# Patient Record
Sex: FEMALE | Race: WHITE | NOT HISPANIC OR LATINO | Employment: STUDENT | URBAN - METROPOLITAN AREA
[De-identification: names, ages, dates, MRNs, and addresses within clinical notes are randomized per-mention and may not be internally consistent; named-entity substitution may affect disease eponyms.]

---

## 2020-06-24 ENCOUNTER — OFFICE VISIT (OUTPATIENT)
Dept: GASTROENTEROLOGY | Facility: CLINIC | Age: 25
End: 2020-06-24

## 2020-06-24 VITALS
HEART RATE: 75 BPM | BODY MASS INDEX: 19.91 KG/M2 | TEMPERATURE: 97.5 F | WEIGHT: 119.5 LBS | SYSTOLIC BLOOD PRESSURE: 114 MMHG | OXYGEN SATURATION: 98 % | HEIGHT: 65 IN | DIASTOLIC BLOOD PRESSURE: 80 MMHG

## 2020-06-24 DIAGNOSIS — K63.5 POLYP OF COLON, UNSPECIFIED PART OF COLON, UNSPECIFIED TYPE: ICD-10-CM

## 2020-06-24 DIAGNOSIS — Q85.89 PEUTZ-JEGHERS SYNDROME (HCC): Primary | ICD-10-CM

## 2020-06-24 DIAGNOSIS — K31.7 GASTRIC POLYP: ICD-10-CM

## 2020-06-24 PROCEDURE — 99213 OFFICE O/P EST LOW 20 MIN: CPT | Performed by: INTERNAL MEDICINE

## 2020-06-24 RX ORDER — MONTELUKAST SODIUM 10 MG/1
TABLET ORAL
COMMUNITY
Start: 2020-05-12

## 2020-06-24 NOTE — PATIENT INSTRUCTIONS
Check CBC and iron studies today.    Follow up with gynecologist as scheduled for yearly exam.    Schedule capsule endoscopy for evaluation of the small bowel.    We will plan on EGD and colonoscopy to be done December 2020 for surveillance.

## 2020-06-24 NOTE — PROGRESS NOTES
"Follow-up (EGD/CLS)      HPI  Patient is a 24 year old female who presents today for follow up. She has a history of Peutz-Jeghers syndrome.    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    She denies any GI complaints.    CLS 12-19-19 millimeters polyp in the rectum  EGD 12/19/2019 gastric polyps    Biopsies were consistent with hyperplastic polyps of the stomach and \"mucosal prolapse\" from the rectal polyp but being treated as an adenoma    She has an appointment with her gynecologist in 2 weeks for her yearly exam.    Review of Systems   Constitutional: Negative for appetite change, chills, diaphoresis, fatigue, fever and unexpected weight change.   HENT: Negative for dental problem, ear pain, mouth sores, rhinorrhea, sore throat and voice change.    Eyes: Negative for pain, redness and visual disturbance.   Respiratory: Negative for cough, chest tightness and wheezing.    Cardiovascular: Negative for chest pain, palpitations and leg swelling.   Endocrine: Negative for cold intolerance, heat intolerance, polydipsia, polyphagia and polyuria.   Genitourinary: Negative for dysuria, frequency, hematuria and urgency.   Musculoskeletal: Negative for arthralgias, back pain, joint swelling, myalgias and neck pain.   Skin: Negative for rash.   Allergic/Immunologic: Positive for environmental allergies. Negative for food allergies and immunocompromised state.   Neurological: Negative for dizziness, seizures, weakness, numbness and headaches.   Hematological: Does not bruise/bleed easily.   Psychiatric/Behavioral: Negative for sleep disturbance. The patient is not nervous/anxious.         I have reviewed and confirmed the accuracy of the HPI and ROS as documented by the APRN ANAY Mendez     Problem List:  There is no problem list on file for this patient.      Medical History:    Past Medical History:   Diagnosis Date   • Peutz-Jeghers syndrome (CMS/HCC)         Social History:    Social History "     Socioeconomic History   • Marital status: Single     Spouse name: Not on file   • Number of children: Not on file   • Years of education: Not on file   • Highest education level: Not on file   Tobacco Use   • Smoking status: Never Smoker   • Smokeless tobacco: Never Used   Substance and Sexual Activity   • Alcohol use: Yes     Comment: social    • Drug use: Never       Family History:   Family History   Problem Relation Age of Onset   • Colon cancer Neg Hx    • Colon polyps Neg Hx        Surgical History:   Past Surgical History:   Procedure Laterality Date   • COLONOSCOPY     • SINUS SURGERY     • UPPER GASTROINTESTINAL ENDOSCOPY     • WISDOM TOOTH EXTRACTION           Current Outpatient Medications:   •  levonorgestrel (MIRENA) 20 MCG/24HR IUD, 1 each by Intrauterine route., Disp: , Rfl:   •  montelukast (SINGULAIR) 10 MG tablet, TAKE 1 TAB BY MOUTH EVERY MORNING FOR 90 DAYS, Disp: , Rfl:   •  Norethin-Eth Estrad-Fe Biphas (Lo Loestrin Fe) 1 MG-10 MCG / 10 MCG tablet, TAKE 1 TABLET BY MOUTH EVERY DAY, Disp: , Rfl:     Allergies:   Allergies   Allergen Reactions   • Other Other (See Comments)     Pollen,trees,dust,mold,american cockroaches,rat poop,ragweed( and other grasses),mildew,evergreen and cats  Cats  Dogs  Trees  Pollen  Dust  Mold  Ragweed  Grass  Rats  Cockroaches  Pollen,trees,dust,mold,american cockroaches,rat poop,ragweed( and other grasses),mildew,evergreen and cats          The following portions of the patient's history were reviewed and updated as appropriate: allergies, current medications, past family history, past medical history, past social history, past surgical history and problem list.    Vitals:    06/24/20 1249   BP: 114/80   Pulse: 75   Temp: 97.5 °F (36.4 °C)   SpO2: 98%         06/24/20  1249   Weight: 54.2 kg (119 lb 8 oz)     Body mass index is 19.89 kg/m².      Physical Exam   HENT:   Macular spots on oral mucosa (under john)   Abdominal: Soft. Normal appearance and bowel sounds  are normal. She exhibits no distension, no pulsatile midline mass and no mass. There is no tenderness. There is no rigidity, no rebound and no guarding.   Vitals reviewed.       Assessment/ Plan  [place] was seen today for follow-up.    Diagnoses and all orders for this visit:    Peutz-Jeghers syndrome (CMS/HCC)    Polyp of colon, unspecified part of colon, unspecified type    Gastric polyp            No follow-ups on file.    Patient Instructions   Check CBC and iron studies today.    Follow up with gynecologist as scheduled for yearly exam.    Schedule capsule endoscopy for evaluation of the small bowel.    We will plan on EGD and colonoscopy to be done December 2020 for surveillance.          Discussion:  With this condition there is a high risk for: Pancreatic and breast cancer.  Yearly CBCs  Endoscopy and colonoscopy yearly   Either capsule endoscopy or MR enterography about every 3 years  Yearly breast exams  Yearly cervical exams and pelvic ultrasound  Pancreatic imaging with MRCP or endoscopic ultrasound which should be done about every 1 to 2 years as there is actually a family history of pancreatic cancer  Still unclear whether she has a sporadic mutation or hereditary and we have discussed genetic evaluations with her    Documentation by Shruthi CUEVA acting as a scribe in the following sections on behalf of the billable provider: HPI, ROS, assessment, & plan.

## 2020-06-25 LAB
BASOPHILS # BLD AUTO: 0.02 10*3/MM3 (ref 0–0.2)
BASOPHILS NFR BLD AUTO: 0.3 % (ref 0–1.5)
EOSINOPHIL # BLD AUTO: 0.06 10*3/MM3 (ref 0–0.4)
EOSINOPHIL NFR BLD AUTO: 0.9 % (ref 0.3–6.2)
ERYTHROCYTE [DISTWIDTH] IN BLOOD BY AUTOMATED COUNT: 12.5 % (ref 12.3–15.4)
FERRITIN SERPL-MCNC: 12.7 NG/ML (ref 13–150)
HCT VFR BLD AUTO: 39.7 % (ref 34–46.6)
HGB BLD-MCNC: 12.8 G/DL (ref 12–15.9)
IMM GRANULOCYTES # BLD AUTO: 0.02 10*3/MM3 (ref 0–0.05)
IMM GRANULOCYTES NFR BLD AUTO: 0.3 % (ref 0–0.5)
IRON SATN MFR SERPL: 18 % (ref 20–50)
IRON SERPL-MCNC: 107 MCG/DL (ref 37–145)
LYMPHOCYTES # BLD AUTO: 1.83 10*3/MM3 (ref 0.7–3.1)
LYMPHOCYTES NFR BLD AUTO: 27.3 % (ref 19.6–45.3)
MCH RBC QN AUTO: 29.4 PG (ref 26.6–33)
MCHC RBC AUTO-ENTMCNC: 32.2 G/DL (ref 31.5–35.7)
MCV RBC AUTO: 91.3 FL (ref 79–97)
MONOCYTES # BLD AUTO: 0.46 10*3/MM3 (ref 0.1–0.9)
MONOCYTES NFR BLD AUTO: 6.9 % (ref 5–12)
NEUTROPHILS # BLD AUTO: 4.32 10*3/MM3 (ref 1.7–7)
NEUTROPHILS NFR BLD AUTO: 64.3 % (ref 42.7–76)
NRBC BLD AUTO-RTO: 0 /100 WBC (ref 0–0.2)
PLATELET # BLD AUTO: 202 10*3/MM3 (ref 140–450)
RBC # BLD AUTO: 4.35 10*6/MM3 (ref 3.77–5.28)
TIBC SERPL-MCNC: 583 MCG/DL
UIBC SERPL-MCNC: 476 MCG/DL (ref 112–346)
WBC # BLD AUTO: 6.71 10*3/MM3 (ref 3.4–10.8)

## 2020-07-08 ENCOUNTER — TELEPHONE (OUTPATIENT)
Dept: GASTROENTEROLOGY | Facility: CLINIC | Age: 25
End: 2020-07-08

## 2020-07-08 NOTE — TELEPHONE ENCOUNTER
Patient returned call.  Provided appt details and advised patient to contact insurance to verify network status as auth states OON but we are showing that we are in network for [facility].

## 2021-02-01 DIAGNOSIS — Q85.89 PEUTZ-JEGHERS SYNDROME (HCC): Primary | ICD-10-CM

## 2021-02-01 DIAGNOSIS — K31.7 GASTRIC POLYP: ICD-10-CM

## 2021-02-01 DIAGNOSIS — K63.5 POLYP OF COLON, UNSPECIFIED PART OF COLON, UNSPECIFIED TYPE: ICD-10-CM

## 2021-02-03 ENCOUNTER — PREP FOR SURGERY (OUTPATIENT)
Dept: SURGERY | Facility: SURGERY CENTER | Age: 26
End: 2021-02-03

## 2021-02-03 DIAGNOSIS — K31.7 GASTRIC POLYP: ICD-10-CM

## 2021-02-03 DIAGNOSIS — Q85.89 PEUTZ-JEGHERS SYNDROME (HCC): Primary | ICD-10-CM

## 2021-02-03 DIAGNOSIS — K63.5 POLYP OF COLON: ICD-10-CM

## 2021-02-03 RX ORDER — SODIUM CHLORIDE 0.9 % (FLUSH) 0.9 %
3 SYRINGE (ML) INJECTION EVERY 12 HOURS SCHEDULED
Status: CANCELLED | OUTPATIENT
Start: 2021-02-03

## 2021-02-03 RX ORDER — SODIUM CHLORIDE, SODIUM LACTATE, POTASSIUM CHLORIDE, CALCIUM CHLORIDE 600; 310; 30; 20 MG/100ML; MG/100ML; MG/100ML; MG/100ML
30 INJECTION, SOLUTION INTRAVENOUS CONTINUOUS PRN
Status: CANCELLED | OUTPATIENT
Start: 2021-02-03

## 2021-02-03 RX ORDER — SODIUM CHLORIDE 0.9 % (FLUSH) 0.9 %
10 SYRINGE (ML) INJECTION AS NEEDED
Status: CANCELLED | OUTPATIENT
Start: 2021-02-03

## 2021-02-04 ENCOUNTER — PREP FOR SURGERY (OUTPATIENT)
Dept: SURGERY | Facility: SURGERY CENTER | Age: 26
End: 2021-02-04

## 2021-02-16 ENCOUNTER — TRANSCRIBE ORDERS (OUTPATIENT)
Dept: LAB | Facility: SURGERY CENTER | Age: 26
End: 2021-02-16

## 2021-02-16 DIAGNOSIS — Z01.818 OTHER SPECIFIED PRE-OPERATIVE EXAMINATION: Primary | ICD-10-CM

## 2021-03-15 RX ORDER — CETIRIZINE HYDROCHLORIDE 10 MG/1
10 TABLET ORAL DAILY
COMMUNITY

## 2021-03-15 NOTE — SIGNIFICANT NOTE
Education provided the Patient on the following:    - Nothing to Eat or Drink after MN the night before the procedure  -Your required COVID Test is Scheduled on    3/16      Between the Hours of 0805-2263  -You will need to have someone drive you home after your procedure and remain with you for 24 hours after the procedure  - The date of your procedure, your ride is welcome to either remain in our parking lot or within 10-15 minutes of [facility]  -Please wear warm socks and comfortable clothes when you arrive for your procedure  -Remove all jewelry and leave any valuables before arriving on the date of your procedure (all will have to be removed before leaving preop)  -You will need to arrive at   0900        on   3/18        for your procedure  -Feel free to contact us at: [phone] with any additional questions/concerns        Have you ever tested positive for COVID? no

## 2021-03-16 ENCOUNTER — LAB (OUTPATIENT)
Dept: LAB | Facility: SURGERY CENTER | Age: 26
End: 2021-03-16

## 2021-03-16 DIAGNOSIS — Z01.818 OTHER SPECIFIED PRE-OPERATIVE EXAMINATION: ICD-10-CM

## 2021-03-16 LAB — SARS-COV-2 ORF1AB RESP QL NAA+PROBE: NOT DETECTED

## 2021-03-16 PROCEDURE — U0004 COV-19 TEST NON-CDC HGH THRU: HCPCS | Performed by: SURGERY

## 2021-03-16 PROCEDURE — C9803 HOPD COVID-19 SPEC COLLECT: HCPCS

## 2021-03-18 ENCOUNTER — HOSPITAL ENCOUNTER (OUTPATIENT)
Facility: SURGERY CENTER | Age: 26
Setting detail: HOSPITAL OUTPATIENT SURGERY
Discharge: HOME | End: 2021-03-18
Attending: INTERNAL MEDICINE | Admitting: INTERNAL MEDICINE

## 2021-03-18 ENCOUNTER — ANESTHESIA (OUTPATIENT)
Dept: SURGERY | Facility: SURGERY CENTER | Age: 26
End: 2021-03-18

## 2021-03-18 ENCOUNTER — ANESTHESIA EVENT (OUTPATIENT)
Dept: SURGERY | Facility: SURGERY CENTER | Age: 26
End: 2021-03-18

## 2021-03-18 VITALS
BODY MASS INDEX: 19.16 KG/M2 | RESPIRATION RATE: 16 BRPM | HEART RATE: 79 BPM | DIASTOLIC BLOOD PRESSURE: 89 MMHG | OXYGEN SATURATION: 100 % | HEIGHT: 65 IN | TEMPERATURE: 97.8 F | WEIGHT: 115 LBS | SYSTOLIC BLOOD PRESSURE: 121 MMHG

## 2021-03-18 DIAGNOSIS — Q85.89 PEUTZ-JEGHERS SYNDROME (HCC): ICD-10-CM

## 2021-03-18 DIAGNOSIS — K63.5 POLYP OF COLON: ICD-10-CM

## 2021-03-18 DIAGNOSIS — K63.5 POLYP OF COLON, UNSPECIFIED PART OF COLON, UNSPECIFIED TYPE: ICD-10-CM

## 2021-03-18 DIAGNOSIS — K31.7 GASTRIC POLYP: ICD-10-CM

## 2021-03-18 LAB
B-HCG UR QL: NEGATIVE
INTERNAL NEGATIVE CONTROL: NEGATIVE
INTERNAL POSITIVE CONTROL: POSITIVE
Lab: NORMAL

## 2021-03-18 PROCEDURE — 43239 EGD BIOPSY SINGLE/MULTIPLE: CPT | Performed by: INTERNAL MEDICINE

## 2021-03-18 PROCEDURE — 88305 TISSUE EXAM BY PATHOLOGIST: CPT | Performed by: INTERNAL MEDICINE

## 2021-03-18 PROCEDURE — 0DB58ZZ EXCISION OF ESOPHAGUS, VIA NATURAL OR ARTIFICIAL OPENING ENDOSCOPIC: ICD-10-PCS | Performed by: NURSE PRACTITIONER

## 2021-03-18 PROCEDURE — 45380 COLONOSCOPY AND BIOPSY: CPT | Performed by: INTERNAL MEDICINE

## 2021-03-18 PROCEDURE — 81025 URINE PREGNANCY TEST: CPT | Performed by: INTERNAL MEDICINE

## 2021-03-18 PROCEDURE — 0DBN8ZZ EXCISION OF SIGMOID COLON, VIA NATURAL OR ARTIFICIAL OPENING ENDOSCOPIC: ICD-10-PCS | Performed by: NURSE PRACTITIONER

## 2021-03-18 PROCEDURE — 25010000002 PROPOFOL 10 MG/ML EMULSION: Performed by: ANESTHESIOLOGY

## 2021-03-18 RX ORDER — SODIUM CHLORIDE, SODIUM LACTATE, POTASSIUM CHLORIDE, CALCIUM CHLORIDE 600; 310; 30; 20 MG/100ML; MG/100ML; MG/100ML; MG/100ML
30 INJECTION, SOLUTION INTRAVENOUS CONTINUOUS PRN
Status: DISCONTINUED | OUTPATIENT
Start: 2021-03-18 | End: 2021-03-18 | Stop reason: HOSPADM

## 2021-03-18 RX ORDER — PROPOFOL 10 MG/ML
VIAL (ML) INTRAVENOUS AS NEEDED
Status: DISCONTINUED | OUTPATIENT
Start: 2021-03-18 | End: 2021-03-18 | Stop reason: SURG

## 2021-03-18 RX ORDER — SODIUM CHLORIDE 0.9 % (FLUSH) 0.9 %
10 SYRINGE (ML) INJECTION AS NEEDED
Status: DISCONTINUED | OUTPATIENT
Start: 2021-03-18 | End: 2021-03-18 | Stop reason: HOSPADM

## 2021-03-18 RX ORDER — LIDOCAINE HYDROCHLORIDE 10 MG/ML
0.5 INJECTION, SOLUTION INFILTRATION; PERINEURAL ONCE AS NEEDED
Status: DISCONTINUED | OUTPATIENT
Start: 2021-03-18 | End: 2021-03-18 | Stop reason: HOSPADM

## 2021-03-18 RX ORDER — SODIUM CHLORIDE 0.9 % (FLUSH) 0.9 %
3 SYRINGE (ML) INJECTION EVERY 12 HOURS SCHEDULED
Status: DISCONTINUED | OUTPATIENT
Start: 2021-03-18 | End: 2021-03-18 | Stop reason: HOSPADM

## 2021-03-18 RX ORDER — SODIUM CHLORIDE, SODIUM LACTATE, POTASSIUM CHLORIDE, CALCIUM CHLORIDE 600; 310; 30; 20 MG/100ML; MG/100ML; MG/100ML; MG/100ML
1000 INJECTION, SOLUTION INTRAVENOUS CONTINUOUS
Status: DISCONTINUED | OUTPATIENT
Start: 2021-03-18 | End: 2021-03-18 | Stop reason: HOSPADM

## 2021-03-18 RX ORDER — MAGNESIUM HYDROXIDE 1200 MG/15ML
LIQUID ORAL AS NEEDED
Status: DISCONTINUED | OUTPATIENT
Start: 2021-03-18 | End: 2021-03-18 | Stop reason: HOSPADM

## 2021-03-18 RX ORDER — LIDOCAINE HYDROCHLORIDE 20 MG/ML
INJECTION, SOLUTION INFILTRATION; PERINEURAL AS NEEDED
Status: DISCONTINUED | OUTPATIENT
Start: 2021-03-18 | End: 2021-03-18 | Stop reason: SURG

## 2021-03-18 RX ADMIN — PROPOFOL INJECTABLE EMULSION 100 MCG/KG/MIN: 10 INJECTION, EMULSION INTRAVENOUS at 10:10

## 2021-03-18 RX ADMIN — LIDOCAINE HYDROCHLORIDE 100 MG: 20 INJECTION, SOLUTION INFILTRATION; PERINEURAL at 10:10

## 2021-03-18 RX ADMIN — SODIUM CHLORIDE, POTASSIUM CHLORIDE, SODIUM LACTATE AND CALCIUM CHLORIDE 30 ML/HR: 600; 310; 30; 20 INJECTION, SOLUTION INTRAVENOUS at 09:22

## 2021-03-18 RX ADMIN — PROPOFOL 100 MG: 10 INJECTION, EMULSION INTRAVENOUS at 10:10

## 2021-03-18 RX ADMIN — SODIUM CHLORIDE, SODIUM LACTATE, POTASSIUM CHLORIDE, AND CALCIUM CHLORIDE: .6; .31; .03; .02 INJECTION, SOLUTION INTRAVENOUS at 10:20

## 2021-03-18 NOTE — H&P
No chief complaint on file.      HPI  peutz jeger syndrome  Gastric polyps      Review of Systems     Problem List:    Patient Active Problem List   Diagnosis   • Peutz-Jeghers syndrome (CMS/HCC)   • Polyp of colon   • Gastric polyp       Medical History:    Past Medical History:   Diagnosis Date   • Anemia    • Nausea and vomiting    • Peutz-Jeghers syndrome (CMS/HCC)    • Seasonal allergies         Social History:    Social History     Socioeconomic History   • Marital status: Single     Spouse name: Not on file   • Number of children: Not on file   • Years of education: Not on file   • Highest education level: Not on file   Tobacco Use   • Smoking status: Never Smoker   • Smokeless tobacco: Never Used   Substance and Sexual Activity   • Alcohol use: Yes     Comment: social    • Drug use: Never       Family History:   Family History   Problem Relation Age of Onset   • Colonic polyp Other    • Pancreatic cancer Other    • Colon cancer Neg Hx    • Colon polyps Neg Hx        Surgical History:   Past Surgical History:   Procedure Laterality Date   • ADENOIDECTOMY     • CAPSULE ENDOSCOPY     • COLONOSCOPY     • LIP REPAIR      Lip removal of skin   • SINUS SURGERY     • UPPER GASTROINTESTINAL ENDOSCOPY     • WISDOM TOOTH EXTRACTION         No current facility-administered medications for this encounter.    Current Outpatient Medications:   •  cetirizine (zyrTEC) 10 MG tablet, Take 10 mg by mouth Daily., Disp: , Rfl:   •  levonorgestrel (MIRENA) 20 MCG/24HR IUD, 1 each by Intrauterine route., Disp: , Rfl:   •  montelukast (SINGULAIR) 10 MG tablet, TAKE 1 TAB BY MOUTH EVERY MORNING FOR 90 DAYS, Disp: , Rfl:   •  Norethin-Eth Estrad-Fe Biphas (Lo Loestrin Fe) 1 MG-10 MCG / 10 MCG tablet, TAKE 1 TABLET BY MOUTH EVERY DAY, Disp: , Rfl:     Allergies:   Allergies   Allergen Reactions   • Other Other (See Comments)     Pollen,trees,dust,mold,american cockroaches,rat poop,ragweed( and other grasses),mildew,evergreen and  cats  Cats  Dogs  Trees  Pollen  Dust  Mold  Ragweed  Grass  Rats  Cockroaches  Pollen,trees,dust,mold,american cockroaches,rat poop,ragweed( and other grasses),mildew,evergreen and cats          The following portions of the patient's history were reviewed by me and updated as appropriate: review of systems, allergies, current medications, past family history, past medical history, past social history, past surgical history and problem list.    There were no vitals filed for this visit.    PHYSICAL EXAM:    CONSTITUTIONAL:  today's vital signs reviewed by me  GASTROINTESTINAL: abdomen is soft nontender nondistended with normal active bowel sounds, no masses are appreciated    Assessment/ Plan  peutz jeger history  Gastric polyps    egd and colonoscopy    Risks and benefits as well as alternatives to endoscopic evaluation were explained to the patient and they voiced understanding and wish to proceed.  These risks include but are not limited to the risk of bleeding, perforation, adverse reaction to sedation, and missed lesions.  The patient was given the opportunity to ask questions prior to the endoscopic procedure.

## 2021-03-18 NOTE — ANESTHESIA PREPROCEDURE EVALUATION
Anesthesia Evaluation     Nursing notes reviewed   no history of anesthetic complications:               Airway   Mallampati: III  TM distance: >3 FB  Neck ROM: full  Dental      Pulmonary - normal exam   (-) not a smoker  Cardiovascular - normal exam    (-) angina      Neuro/Psych  GI/Hepatic/Renal/Endo      ROS Comment: H/O Gastric Polyp  F/H Pancreatic Cancer    Musculoskeletal     Abdominal    Substance History      OB/GYN    (-)  Pregnant        Other          Other Comment: Peutz-Jeghers Syndrome                Anesthesia Plan    ASA 2     MAC       Anesthetic plan, all risks, benefits, and alternatives have been provided, discussed and informed consent has been obtained with: patient.    Plan discussed with medical student.

## 2021-03-18 NOTE — ANESTHESIA POSTPROCEDURE EVALUATION
Patient: Monse Kenyon    Procedure Summary     Date: 03/18/21 Room / Location: [facility]    Anesthesia Start: 1008 Anesthesia Stop: 1055    Procedures:       ESOPHAGOGASTRODUODENOSCOPY (N/A )      COLONOSCOPY (N/A ) Diagnosis:       Peutz-Jeghers syndrome (CMS/HCC)      Polyp of colon, unspecified part of colon, unspecified type      Gastric polyp      (Peutz-Jeghers syndrome (CMS/HCC) [Q85.8])      (Polyp of colon, unspecified part of colon, unspecified type [K63.5])      (Gastric polyp [K31.7])    Surgeons: Jonel Phillip MD Provider: Can Llanos MD    Anesthesia Type: MAC ASA Status: 2          Anesthesia Type: MAC    Vitals  No vitals data found for the desired time range.          Post Anesthesia Care and Evaluation    Patient location during evaluation: PHASE II  Anesthetic complications: No anesthetic complications

## 2021-03-23 LAB
CYTO UR: NORMAL
LAB AP CASE REPORT: NORMAL
LAB AP CLINICAL INFORMATION: NORMAL
LAB AP DIAGNOSIS COMMENT: NORMAL
PATH REPORT.FINAL DX SPEC: NORMAL
PATH REPORT.GROSS SPEC: NORMAL

## 2021-04-07 ENCOUNTER — OFFICE VISIT (OUTPATIENT)
Dept: GASTROENTEROLOGY | Facility: CLINIC | Age: 26
End: 2021-04-07

## 2021-04-07 DIAGNOSIS — Q85.89 PEUTZ-JEGHERS SYNDROME (HCC): Primary | ICD-10-CM

## 2021-04-07 DIAGNOSIS — Z80.0 FAMILY HISTORY OF PANCREATIC CANCER: ICD-10-CM

## 2021-04-07 PROCEDURE — 99442 PR PHYS/QHP TELEPHONE EVALUATION 11-20 MIN: CPT | Performed by: NURSE PRACTITIONER

## 2021-04-07 NOTE — PROGRESS NOTES
Chief Complaint   Patient presents with   • Follow-up     Review recent EGD and colonoscopy         History of Present Illness  25-year-old female presents today for follow-up.  Last office visit June 24, 2020.  She presents today for follow-up after EGD and colonoscopy.  She has a history of Peutz-Jeghers syndrome, genetic testing consistent with sporadic mutation.  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    She is doing well from a GI standpoint.  She denies abdominal pain, nausea, vomiting.    She is up-to-date with gynecological exams, due summer 2021.  Also due for bilateral breast ultrasounds summer 2021.    You have chosen to receive care through a telephone visit.   Do you consent to use a telephone visit for your medical care today? Yes     Result Review :   Tissue Pathology Exam (03/18/2021 10:16)   COLONOSCOPY (03/18/2021 10:01)   UPPER GI ENDOSCOPY (03/18/2021 10:01)   ENDOSCOPY - SCAN - ENDOSCOPY, GI W/ CAPSULE (07/27/2020)     Physical Exam - TELEPHONE VISIT    Assessment and Plan    Diagnoses and all orders for this visit:    1. Peutz-Jeghers syndrome (CMS/HCC) (Primary)  -     MRI abdomen w wo contrast mrcp; Future    2. Family history of pancreatic cancer  -     MRI abdomen w wo contrast mrcp; Future       Reviewed recent EGD and colonoscopy.  Mild of the terminal ileum is from NSAID use as discussed with patient at the time of her endoscopic evaluation, biopsies benign.  Small gastric hyperplastic polyps or potential harmartomatous polyps, small in size and removed entirely.     Discussed overall monitoring for patients with Peutz-Jeghers syndrome with higher risk for pancreatic and breast cancer.    Recommendations include yearly CBCs, EGD and colonoscopy yearly.  Capsule endoscopy or MR enterography every 3 years  Yearly breast exam.  Yearly cervical exam and pelvic ultrasound.  Pancreatic imaging with MRCP or endoscopic ultrasound which should be done every 1 to 2 years given family history of  pancreatic cancer as well.    She is due for pancreatic imaging with MRCP, last was July 2019.    Last capsule endoscopy June 2020 and up to date.     We will plan on EGD and colonoscopy in 1 year, placed in recall, we will contact her November or December 2021 to schedule for 2022.    Given density of breast tissue, recommendation for bilateral breast ultrasound has been made for once a year with her gynecologist, due summer 2021 and we will reach out to her gynecologist, Dr. Luiza Lechuga to request scheduling with her office and [facility] system as her previous imaging and breast biopsy were performed with her office.  If patient has not heard from them in 1 week, she was instructed to contact to inquire on bilateral ultrasound orders.      This visit has been rescheduled as a phone visit to comply with patient safety concerns in accordance with CDC recommendations. Total time of discussion was 13 minutes.

## 2021-04-16 ENCOUNTER — BULK ORDERING (OUTPATIENT)
Dept: CASE MANAGEMENT | Facility: OTHER | Age: 26
End: 2021-04-16

## 2021-04-16 DIAGNOSIS — Z23 IMMUNIZATION DUE: ICD-10-CM

## 2021-06-21 ENCOUNTER — HOSPITAL ENCOUNTER (OUTPATIENT)
Dept: MRI IMAGING | Facility: HOSPITAL | Age: 26
Discharge: HOME | End: 2021-06-21

## 2021-06-21 DIAGNOSIS — Z80.0 FAMILY HISTORY OF PANCREATIC CANCER: ICD-10-CM

## 2021-06-21 DIAGNOSIS — Q85.89 PEUTZ-JEGHERS SYNDROME (HCC): ICD-10-CM

## 2021-06-21 RX ORDER — PREDNISONE 50 MG/1
TABLET ORAL
Qty: 3 TABLET | Refills: 0 | Status: SHIPPED | OUTPATIENT
Start: 2021-06-21 | End: 2021-07-28

## 2021-06-21 NOTE — NURSING NOTE
Patient here for outpatient MRI.  Had rash reaction to last MRI with Multihance, rash within 20 minutes of leaving.  This was not listed on her allergy list and I have added this now.  Called Dr. Phillip's office, spoke with Tamera, to notify to reschedule with pre-med protocol.  Gave patient pre med instructions as well.

## 2021-06-23 ENCOUNTER — HOSPITAL ENCOUNTER (OUTPATIENT)
Dept: MRI IMAGING | Facility: HOSPITAL | Age: 26
Discharge: HOME | End: 2021-06-23
Admitting: NURSE PRACTITIONER

## 2021-06-23 PROCEDURE — A9577 INJ MULTIHANCE: HCPCS | Performed by: NURSE PRACTITIONER

## 2021-06-23 PROCEDURE — 74183 MRI ABD W/O CNTR FLWD CNTR: CPT

## 2021-06-23 PROCEDURE — 0 GADOBENATE DIMEGLUMINE 529 MG/ML SOLUTION: Performed by: NURSE PRACTITIONER

## 2021-06-23 RX ADMIN — GADOBENATE DIMEGLUMINE 10 ML: 529 INJECTION, SOLUTION INTRAVENOUS at 13:11

## 2021-07-19 ENCOUNTER — APPOINTMENT (OUTPATIENT)
Dept: MRI IMAGING | Facility: HOSPITAL | Age: 26
End: 2021-07-19

## 2021-07-21 ENCOUNTER — TELEPHONE (OUTPATIENT)
Dept: GASTROENTEROLOGY | Facility: CLINIC | Age: 26
End: 2021-07-21

## 2021-07-21 NOTE — TELEPHONE ENCOUNTER
Dr BACA,  She saw Lizbeth on 04/07/21 for follow-up after her scope.  Her note says last capsule endoscopy was on 06/20 and is up to date.  Does she need to be seen again?  Please clarify.  Thanks

## 2021-07-21 NOTE — TELEPHONE ENCOUNTER
----- Message from Jonel Phillip MD sent at 7/13/2021 11:59 AM EDT -----  Liliana,    This patient needs a televisit with Lizbeth Martin.  During that televisit may need to review the endoscopic findings of polyps that are consistent with her acute Obie syndrome.  Determine appropriate surveillance and ensure that she has had a capsule study within the last 2 to 3 years  Scheduled appt with SILVANA pike

## 2021-07-28 ENCOUNTER — PREP FOR SURGERY (OUTPATIENT)
Dept: SURGERY | Facility: SURGERY CENTER | Age: 26
End: 2021-07-28

## 2021-07-28 ENCOUNTER — OFFICE VISIT (OUTPATIENT)
Dept: GASTROENTEROLOGY | Facility: CLINIC | Age: 26
End: 2021-07-28

## 2021-07-28 DIAGNOSIS — K31.7 GASTRIC POLYP: ICD-10-CM

## 2021-07-28 DIAGNOSIS — Z80.0 FAMILY HISTORY OF PANCREATIC CANCER: ICD-10-CM

## 2021-07-28 DIAGNOSIS — Q85.89 PEUTZ-JEGHERS SYNDROME (HCC): Primary | ICD-10-CM

## 2021-07-28 DIAGNOSIS — N28.9 KIDNEY LESION, NATIVE, RIGHT: ICD-10-CM

## 2021-07-28 DIAGNOSIS — K63.5 POLYP OF COLON: ICD-10-CM

## 2021-07-28 PROCEDURE — 99442 PR PHYS/QHP TELEPHONE EVALUATION 11-20 MIN: CPT | Performed by: NURSE PRACTITIONER

## 2021-07-28 RX ORDER — SODIUM CHLORIDE 0.9 % (FLUSH) 0.9 %
10 SYRINGE (ML) INJECTION AS NEEDED
Status: CANCELLED | OUTPATIENT
Start: 2021-07-28

## 2021-07-28 RX ORDER — SODIUM CHLORIDE, SODIUM LACTATE, POTASSIUM CHLORIDE, CALCIUM CHLORIDE 600; 310; 30; 20 MG/100ML; MG/100ML; MG/100ML; MG/100ML
30 INJECTION, SOLUTION INTRAVENOUS CONTINUOUS PRN
Status: CANCELLED | OUTPATIENT
Start: 2021-07-28

## 2021-07-28 RX ORDER — SODIUM CHLORIDE 0.9 % (FLUSH) 0.9 %
3 SYRINGE (ML) INJECTION EVERY 12 HOURS SCHEDULED
Status: CANCELLED | OUTPATIENT
Start: 2021-07-28

## 2021-07-28 NOTE — PROGRESS NOTES
Chief Complaint   Patient presents with   • Follow-up     Peutz-Jeghers, review MRI         History of Present Illness  25-year-old female presents today for follow-up.  Last visit April 7, 2021.  EGD and colonoscopy performed March 18, 2021.  MRI June 23, 2021.  She has a history of Peutz-Jeghers Syndrome, genetic testing consistent with sporadic mutation.  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     She is doing well from a GI standpoint.  She denies abdominal pain, nausea, vomiting.     She is up to date with gynecological exams.  Also following with gynecology for abnormal breast imaging, MRI July 17, 2021, plans for breast biopsy.    You have chosen to receive care through a telephone visit.   Do you consent to use a telephone visit for your medical care today? Yes     Result Review :      MRI abdomen w wo contrast mrcp (06/23/2021 13:27)   ENDOSCOPY - SCAN - ENDOSCOPY, GI W/ CAPSULE (07/27/2020)     Physical Exam - TELEPHONE VISIT    Assessment and Plan    Diagnoses and all orders for this visit:    1. Peutz-Jeghers syndrome (CMS/HCC) (Primary)  -     MRI abdomen w wo contrast mrcp; Future  -     Endoscopy, GI With Capsule; Future    2. Family history of pancreatic cancer  -     MRI abdomen w wo contrast mrcp; Future  -     Endoscopy, GI With Capsule; Future    3. Kidney lesion, native, right  -     MRI abdomen w wo contrast mrcp; Future    Reviewed recent test results including previous capsule endoscopy, EGD and colonoscopy and recent MRI.  EGD and colonoscopy with mild terminal ileum inflammation from NSAID use, biopsies benign.  Small gastric hyperplastic polyps are potential hamartomatous polyps, small in size and removed entirely.    Overall patients with Peutz-Jeghers syndrome have a higher risk of pancreatic and breast cancer.    Recommendations include yearly:   CBC   EGD and colonoscopy yearly -March 2021.   Capsule endoscopy or MR enterography every 3 years,  last July 27, 2020.  Pancreatic imaging with MRCP or endoscopic ultrasound should be done every 1 to 2 years given family history of pancreatic cancer as well, last June 23, 2021.  Yearly breast exam -up-to-date.  Yearly cervical exam and pelvic ultrasound -up-to-date.    Plans for endoscopic evaluation with upper and lower scope during spring break next year March 14 through March 18, 2022, orders have been placed.  Also recommend small bowel capsule endoscopy for monitoring, 2-year interval at the same time/spring break week if possible, orders have been placed.    Will also continue pancreatic imaging with MRCP once a year, due June 2022, orders have been placed.    Recommend CBC once a year for monitoring, she will discuss with Dr. Mccartney at upcoming breast biopsy to see if this can be obtained for monitoring otherwise she will contact our office and we will be happy to provide an order.    MRI of the abdomen with 0.6 cm nonenhancing lesion of the right kidney suggestive of a cyst.  I have reached out to radiologist to see if this needs sooner imaging or monitoring otherwise we have planned for MR pancreatic protocol in 1 year given significant family history and PJS.  Will wait for return phone call and recommendations and update patient accordingly.    Patient verbalized agreement and understanding with the above plan, all questions answered and support provided.    Addendum 7/30/2021: Received message back from the radiologist, right renal cyst felt to be present since 2017 with no concerning features and no additional follow-up recommended.  This was discussed with patient.  Shruthi CUEVA      This visit has been rescheduled as a phone visit to comply with patient safety concerns in accordance with CDC recommendations. Total time of discussion was 15 minutes.    EMR Dragon/Transcription Disclaimer:  This document has been Dictated utilizing Dragon dictation.

## 2021-07-30 ENCOUNTER — TELEPHONE (OUTPATIENT)
Dept: GASTROENTEROLOGY | Facility: CLINIC | Age: 26
End: 2021-07-30

## 2021-07-30 NOTE — TELEPHONE ENCOUNTER
----- Message from Jonel Phillip MD sent at 7/13/2021 11:59 AM EDT -----  Liliana,    This patient needs a televisit with Lizbeth Martin.  During that televisit may need to review the endoscopic findings of polyps that are consistent with her acute Obie syndrome.  Determine appropriate surveillance and ensure that she has had a capsule study within the last 2 to 3 years    Patient saw SILVANA pike

## 2021-07-30 NOTE — TELEPHONE ENCOUNTER
----- Message from Jonel Phillip MD sent at 7/13/2021 11:59 AM EDT -----  Liliana,    This patient needs a televisit with Lizbeth Martin.  During that televisit may need to review the endoscopic findings of polyps that are consistent with her acute Obie syndrome.  Determine appropriate surveillance and ensure that she has had a capsule study within the last 2 to 3 years    Appt with CHANA 07/28.  glendy

## 2021-10-02 ENCOUNTER — PATIENT MESSAGE (OUTPATIENT)
Dept: GASTROENTEROLOGY | Facility: CLINIC | Age: 26
End: 2021-10-02

## 2021-10-02 DIAGNOSIS — Z80.0 FAMILY HISTORY OF PANCREATIC CANCER: ICD-10-CM

## 2021-10-02 DIAGNOSIS — Q85.89 PEUTZ-JEGHERS SYNDROME (HCC): Primary | ICD-10-CM

## 2021-10-04 ENCOUNTER — TRANSCRIBE ORDERS (OUTPATIENT)
Dept: LAB | Facility: SURGERY CENTER | Age: 26
End: 2021-10-04

## 2021-10-04 DIAGNOSIS — Z01.818 OTHER SPECIFIED PRE-OPERATIVE EXAMINATION: Primary | ICD-10-CM

## 2022-03-02 ENCOUNTER — TELEPHONE (OUTPATIENT)
Dept: GASTROENTEROLOGY | Facility: CLINIC | Age: 27
End: 2022-03-02

## 2022-03-02 DIAGNOSIS — T50.8X5S ALLERGIC REACTION TO CONTRAST MATERIAL, SEQUELA: Primary | ICD-10-CM

## 2022-03-02 NOTE — TELEPHONE ENCOUNTER
PT IS SCHEDULED FOR MRCP ON 03/14/2022 AND IS ALLERGIC TO CONTRAST WILL NEED 13 HOUR PROTOCOL CALLED INTO PHARMACY ON FILE. ALSO PT IS SCHEDULED FOR A BREAST BX Friday THAT WEEK THAT ALSO HAS CONTRAST AND WANT TO KNOW IF THAT WILL BE OKAY OR IF SHE SHOULD RESCHEDULE ONE.

## 2022-03-04 RX ORDER — PREDNISONE 50 MG/1
TABLET ORAL
Qty: 3 TABLET | Refills: 0 | Status: ON HOLD | OUTPATIENT
Start: 2022-03-04 | End: 2022-03-16

## 2022-03-04 RX ORDER — DIPHENHYDRAMINE HCL 25 MG
CAPSULE ORAL
Qty: 2 CAPSULE | Refills: 0 | Status: SHIPPED | OUTPATIENT
Start: 2022-03-04 | End: 2023-03-13 | Stop reason: SDUPTHER

## 2022-03-04 NOTE — TELEPHONE ENCOUNTER
These contact patient and let her know that I have sent in prescription for prednisone and benadryl sent to pharmacy, take as directed prior to MRI.     Ok for both procedures the same week, make sure to increase fluids and hydrate well prior to procedure and week of procedure.     Kuldeep

## 2022-03-11 NOTE — SIGNIFICANT NOTE
Education provided the Patient on the following:    - Nothing to Eat or Drink after MN the night before the procedure  -Your required COVID Test is Scheduled on 3/14Between the Hours of 4686-5340  -You will only be notified if your COVID test Result is POSITIVE  -The importance of reducing your number of contacts by self quarantining after you COVID test until the date of your Colonoscopy and EGD    - Avoid red/purple fluids while completing their bowel prep as ordered by physician  -Contact Gastrointerologist office for any questions about specific details regarding colon prep    -You will need to have someone drive you home after your colonoscopy and remain with you for 24 hours after the procedure  - The date of your procedure, your are welcome to have one visitor at bedside or remain within 10-15 minutes of [facility]  -Please wear warm socks when you arrive for your procedure  -Remove all jewelry and leave any valuables before arriving the day of your procedure (all will have to be removed before leaving preop)  -You will need to arrive at 0730 on 3/16procedure    -Feel free to contact us at: [phone] with any additional questions/concerns

## 2022-03-14 ENCOUNTER — APPOINTMENT (OUTPATIENT)
Dept: LAB | Facility: SURGERY CENTER | Age: 27
End: 2022-03-14

## 2022-03-14 ENCOUNTER — HOSPITAL ENCOUNTER (OUTPATIENT)
Dept: MRI IMAGING | Facility: HOSPITAL | Age: 27
Discharge: HOME | End: 2022-03-14
Admitting: NURSE PRACTITIONER

## 2022-03-14 ENCOUNTER — LAB (OUTPATIENT)
Dept: LAB | Facility: SURGERY CENTER | Age: 27
End: 2022-03-14

## 2022-03-14 DIAGNOSIS — K31.7 GASTRIC POLYP: ICD-10-CM

## 2022-03-14 DIAGNOSIS — Q85.89 PEUTZ-JEGHERS SYNDROME: ICD-10-CM

## 2022-03-14 DIAGNOSIS — N28.9 KIDNEY LESION, NATIVE, RIGHT: ICD-10-CM

## 2022-03-14 DIAGNOSIS — K63.5 POLYP OF COLON: ICD-10-CM

## 2022-03-14 DIAGNOSIS — Z80.0 FAMILY HISTORY OF PANCREATIC CANCER: ICD-10-CM

## 2022-03-14 PROCEDURE — C9803 HOPD COVID-19 SPEC COLLECT: HCPCS

## 2022-03-14 PROCEDURE — 0 GADOBENATE DIMEGLUMINE 529 MG/ML SOLUTION: Performed by: NURSE PRACTITIONER

## 2022-03-14 PROCEDURE — A9577 INJ MULTIHANCE: HCPCS | Performed by: NURSE PRACTITIONER

## 2022-03-14 PROCEDURE — U0004 COV-19 TEST NON-CDC HGH THRU: HCPCS | Performed by: NURSE PRACTITIONER

## 2022-03-14 PROCEDURE — 74183 MRI ABD W/O CNTR FLWD CNTR: CPT

## 2022-03-14 RX ADMIN — GADOBENATE DIMEGLUMINE 10 ML: 529 INJECTION, SOLUTION INTRAVENOUS at 08:02

## 2022-03-15 LAB — SARS-COV-2 ORF1AB RESP QL NAA+PROBE: NOT DETECTED

## 2022-03-16 ENCOUNTER — ANESTHESIA (OUTPATIENT)
Dept: SURGERY | Facility: SURGERY CENTER | Age: 27
End: 2022-03-16

## 2022-03-16 ENCOUNTER — ANESTHESIA EVENT (OUTPATIENT)
Dept: SURGERY | Facility: SURGERY CENTER | Age: 27
End: 2022-03-16

## 2022-03-16 ENCOUNTER — HOSPITAL ENCOUNTER (OUTPATIENT)
Facility: SURGERY CENTER | Age: 27
Setting detail: HOSPITAL OUTPATIENT SURGERY
Discharge: HOME | End: 2022-03-16
Attending: INTERNAL MEDICINE | Admitting: INTERNAL MEDICINE

## 2022-03-16 VITALS
HEART RATE: 80 BPM | DIASTOLIC BLOOD PRESSURE: 89 MMHG | SYSTOLIC BLOOD PRESSURE: 136 MMHG | BODY MASS INDEX: 19.76 KG/M2 | RESPIRATION RATE: 16 BRPM | OXYGEN SATURATION: 99 % | WEIGHT: 118.6 LBS | HEIGHT: 65 IN | TEMPERATURE: 97.7 F

## 2022-03-16 DIAGNOSIS — K63.5 POLYP OF COLON: ICD-10-CM

## 2022-03-16 DIAGNOSIS — Q85.89 PEUTZ-JEGHERS SYNDROME: ICD-10-CM

## 2022-03-16 DIAGNOSIS — K31.7 GASTRIC POLYP: ICD-10-CM

## 2022-03-16 LAB
B-HCG UR QL: NEGATIVE
EXPIRATION DATE: NORMAL
INTERNAL NEGATIVE CONTROL: NEGATIVE
INTERNAL POSITIVE CONTROL: POSITIVE
Lab: NORMAL

## 2022-03-16 PROCEDURE — 25010000002 PROPOFOL 10 MG/ML EMULSION: Performed by: ANESTHESIOLOGY

## 2022-03-16 PROCEDURE — 43239 EGD BIOPSY SINGLE/MULTIPLE: CPT | Performed by: INTERNAL MEDICINE

## 2022-03-16 PROCEDURE — 88305 TISSUE EXAM BY PATHOLOGIST: CPT | Performed by: INTERNAL MEDICINE

## 2022-03-16 PROCEDURE — 45380 COLONOSCOPY AND BIOPSY: CPT | Performed by: INTERNAL MEDICINE

## 2022-03-16 PROCEDURE — 81025 URINE PREGNANCY TEST: CPT | Performed by: INTERNAL MEDICINE

## 2022-03-16 RX ORDER — LIDOCAINE HYDROCHLORIDE 20 MG/ML
INJECTION, SOLUTION INFILTRATION; PERINEURAL AS NEEDED
Status: DISCONTINUED | OUTPATIENT
Start: 2022-03-16 | End: 2022-03-16 | Stop reason: SURG

## 2022-03-16 RX ORDER — SODIUM CHLORIDE, SODIUM LACTATE, POTASSIUM CHLORIDE, CALCIUM CHLORIDE 600; 310; 30; 20 MG/100ML; MG/100ML; MG/100ML; MG/100ML
30 INJECTION, SOLUTION INTRAVENOUS CONTINUOUS PRN
Status: DISCONTINUED | OUTPATIENT
Start: 2022-03-16 | End: 2022-03-16 | Stop reason: HOSPADM

## 2022-03-16 RX ORDER — SODIUM CHLORIDE 0.9 % (FLUSH) 0.9 %
3 SYRINGE (ML) INJECTION EVERY 12 HOURS SCHEDULED
Status: DISCONTINUED | OUTPATIENT
Start: 2022-03-16 | End: 2022-03-16 | Stop reason: HOSPADM

## 2022-03-16 RX ORDER — SODIUM CHLORIDE 0.9 % (FLUSH) 0.9 %
10 SYRINGE (ML) INJECTION AS NEEDED
Status: DISCONTINUED | OUTPATIENT
Start: 2022-03-16 | End: 2022-03-16 | Stop reason: HOSPADM

## 2022-03-16 RX ORDER — PROPOFOL 10 MG/ML
VIAL (ML) INTRAVENOUS AS NEEDED
Status: DISCONTINUED | OUTPATIENT
Start: 2022-03-16 | End: 2022-03-16 | Stop reason: SURG

## 2022-03-16 RX ORDER — MAGNESIUM HYDROXIDE 1200 MG/15ML
LIQUID ORAL AS NEEDED
Status: DISCONTINUED | OUTPATIENT
Start: 2022-03-16 | End: 2022-03-16 | Stop reason: HOSPADM

## 2022-03-16 RX ADMIN — PROPOFOL 50 MG: 10 INJECTION, EMULSION INTRAVENOUS at 08:20

## 2022-03-16 RX ADMIN — PROPOFOL 200 MG: 10 INJECTION, EMULSION INTRAVENOUS at 08:16

## 2022-03-16 RX ADMIN — PROPOFOL 30 MG: 10 INJECTION, EMULSION INTRAVENOUS at 08:31

## 2022-03-16 RX ADMIN — PROPOFOL 120 MCG/KG/MIN: 10 INJECTION, EMULSION INTRAVENOUS at 08:18

## 2022-03-16 RX ADMIN — SODIUM CHLORIDE, POTASSIUM CHLORIDE, SODIUM LACTATE AND CALCIUM CHLORIDE 30 ML/HR: 600; 310; 30; 20 INJECTION, SOLUTION INTRAVENOUS at 08:08

## 2022-03-16 RX ADMIN — LIDOCAINE HYDROCHLORIDE 100 MG: 20 INJECTION, SOLUTION INFILTRATION; PERINEURAL at 08:15

## 2022-03-16 NOTE — H&P
No chief complaint on file.      HPI  peutz jeger, ileitis         Problem List:    Patient Active Problem List   Diagnosis   • Peutz-Jeghers syndrome (HCC)   • Polyp of colon   • Gastric polyp   • Family history of pancreatic cancer       Medical History:    Past Medical History:   Diagnosis Date   • Anemia    • Nausea and vomiting    • Peutz-Jeghers syndrome (HCC)    • Seasonal allergies         Social History:    Social History     Socioeconomic History   • Marital status: Single   Tobacco Use   • Smoking status: Never Smoker   • Smokeless tobacco: Never Used   Vaping Use   • Vaping Use: Never used   Substance and Sexual Activity   • Alcohol use: Yes     Comment: social    • Drug use: Never       Family History:   Family History   Problem Relation Age of Onset   • Colonic polyp Other    • Pancreatic cancer Other    • Colon cancer Neg Hx    • Colon polyps Neg Hx    • Crohn's disease Neg Hx    • Irritable bowel syndrome Neg Hx    • Ulcerative colitis Neg Hx        Surgical History:   Past Surgical History:   Procedure Laterality Date   • ADENOIDECTOMY     • CAPSULE ENDOSCOPY     • COLONOSCOPY     • COLONOSCOPY N/A 3/18/2021    Procedure: COLONOSCOPY;  Surgeon: Jonel Phillip MD;  [place] [facility] [place];  Service: Gastroenterology;  Laterality: N/A;   • ENDOSCOPY N/A 3/18/2021    Procedure: ESOPHAGOGASTRODUODENOSCOPY;  Surgeon: Jonel Phillip MD;  [place] [facility] [place] [facility];  Service: Gastroenterology;  Laterality: N/A;   • LIP REPAIR      Lip removal of skin   • SINUS SURGERY     • UPPER GASTROINTESTINAL ENDOSCOPY     • WISDOM TOOTH EXTRACTION         No current facility-administered medications for this encounter.    Current Outpatient Medications:   •  cetirizine (zyrTEC) 10 MG tablet, Take 10 mg by mouth Daily., Disp: , Rfl:   •  diphenhydrAMINE (Benadryl Allergy) 25 mg capsule, Take 50 mg (2 pills) by mouth one hour prior to scheduled testing., Disp: 2 capsule, Rfl: 0  •  levonorgestrel (MIRENA) 20  MCG/24HR IUD, 1 each by Intrauterine route., Disp: , Rfl:   •  lisdexamfetamine (Vyvanse) 20 MG capsule, , Disp: , Rfl:   •  montelukast (SINGULAIR) 10 MG tablet, TAKE 1 TAB BY MOUTH EVERY MORNING FOR 90 DAYS, Disp: , Rfl:   •  Norethin-Eth Estrad-Fe Biphas (Lo Loestrin Fe) 1 MG-10 MCG / 10 MCG tablet, TAKE 1 TABLET BY MOUTH EVERY DAY, Disp: , Rfl:   •  predniSONE (DELTASONE) 50 MG tablet, Take one pill by mouth 13 hours before scheduled test, Take one pill 8 hours before scheduled test. Take one pill one hour before scheduled test, Disp: 3 tablet, Rfl: 0    Allergies:   Allergies   Allergen Reactions   • Contrast Dye Hives   • Other Other (See Comments)     Pollen,trees,dust,mold,american cockroaches,rat poop,ragweed( and other grasses),mildew,evergreen and cats  Cats  Dogs  Trees  Pollen  Dust  Mold  Ragweed  Grass  Rats  Cockroaches  Pollen,trees,dust,mold,american cockroaches,rat poop,ragweed( and other grasses),mildew,evergreen and cats     • Multihance [Gadobenate] Rash        The following portions of the patient's history were reviewed by me and updated as appropriate: review of systems, allergies, current medications, past family history, past medical history, past social history, past surgical history and problem list.    There were no vitals filed for this visit.    PHYSICAL EXAM:    CONSTITUTIONAL:  today's vital signs reviewed by me  GASTROINTESTINAL: abdomen is soft nontender nondistended with normal active bowel sounds, no masses are appreciated    Assessment/ Plan  MARY moore    egd and colonoscopy    Risks and benefits as well as alternatives to endoscopic evaluation were explained to the patient and they voiced understanding and wish to proceed.  These risks include but are not limited to the risk of bleeding, perforation, adverse reaction to sedation, and missed lesions.  The patient was given the opportunity to ask questions prior to the endoscopic procedure.

## 2022-03-16 NOTE — ANESTHESIA POSTPROCEDURE EVALUATION
"Patient: Monse Kenyon    Procedure Summary     Date: 03/16/22 Room / [facility]    Anesthesia Start: 0812 Anesthesia Stop: 0845    Procedures:       ESOPHAGOGASTRODUODENOSCOPY (N/A )      COLONOSCOPY FOR SCREENING (N/A ) Diagnosis:       Peutz-Jeghers syndrome (HCC)      Polyp of colon      Gastric polyp      (Peutz-Jeghers syndrome (CMS/HCC) [Q85.8])      (Polyp of colon [K63.5])      (Gastric polyp [K31.7])    Surgeons: Jonel Phillip MD Provider: Can Osborn MD    Anesthesia Type: MAC ASA Status: 1          Anesthesia Type: MAC    Vitals  Vitals Value Taken Time   /82 03/16/22 0849   Temp 36.5 °C (97.7 °F) 03/16/22 0844   Pulse 90 03/16/22 0849   Resp 16 03/16/22 0849   SpO2 100 % 03/16/22 0849           Post Anesthesia Care and Evaluation    Pain management: adequate  Airway patency: patent  Anesthetic complications: No anesthetic complications    Cardiovascular status: acceptable  Respiratory status: acceptable  Hydration status: acceptable    Comments: /82 (BP Location: Left arm, Patient Position: Lying)   Pulse 90   Temp 36.5 °C (97.7 °F) (Temporal)   Resp 16   Ht 165.1 cm (65\")   Wt 53.8 kg (118 lb 9.6 oz)   SpO2 100%   BMI 19.74 kg/m²         "

## 2022-03-16 NOTE — ANESTHESIA PREPROCEDURE EVALUATION
Anesthesia Evaluation                  Airway   Mallampati: II  Dental      Pulmonary    (-) asthma, sleep apnea, not a smoker    ROS comment: Negative patient screen for YAMILE    Cardiovascular     (-) hypertension      Neuro/Psych  GI/Hepatic/Renal/Endo      Musculoskeletal     Abdominal    Substance History      OB/GYN          Other                        Anesthesia Plan    ASA 1     MAC       Anesthetic plan, all risks, benefits, and alternatives have been provided, discussed and informed consent has been obtained with: patient.        CODE STATUS:

## 2022-03-16 NOTE — DISCHARGE INSTRUCTIONS
ENDOSCOPY - EGD/COLONOSCOPY       ADULT CARE DISCHARGE  INSTRUCTIONS     Symptoms you may temporarily experience:      Sore Throat     Hoarseness     Bloating/Cramping     Dizziness     IV Irritation/tenderness     Gas or Belching     Slight fever     Small amount of blood in vomit or stool       Call Your Doctor for the following Problems: ______________________     ________________     Fever of 101 degrees or higher       Sharp abdominal  pain     Red streak up the arm from the IV site     Severe cramping        Large amount of blood in stool or vomit       Instructions for the next 24 hours after your Procedure:     Adult supervision     Do NOT drink any alcohol      Do not work today     NO important decisions     DO NOT sign any legal documents     You may shower/ bathe       DO NOT  DRIVE or operate machinery     Resume normal activity tomorrow       Discharge  Diet:     Avoid spicy/ greasy foods     Avoid any food that will cause more gas or bloating       *** Seek IMMEDIATE medical attention and call 911 if you develop symptoms such as:     Chest pain     Shortness of breath     Severe bleeding

## 2022-03-18 LAB
LAB AP CASE REPORT: NORMAL
PATH REPORT.FINAL DX SPEC: NORMAL
PATH REPORT.GROSS SPEC: NORMAL

## 2022-12-29 ENCOUNTER — PREP FOR SURGERY (OUTPATIENT)
Dept: SURGERY | Facility: SURGERY CENTER | Age: 27
End: 2022-12-29

## 2022-12-29 ENCOUNTER — TELEPHONE (OUTPATIENT)
Dept: GASTROENTEROLOGY | Facility: CLINIC | Age: 27
End: 2022-12-29

## 2022-12-29 DIAGNOSIS — K63.5 POLYP OF COLON, UNSPECIFIED PART OF COLON, UNSPECIFIED TYPE: Primary | ICD-10-CM

## 2022-12-29 DIAGNOSIS — Q85.89 PEUTZ-JEGHERS SYNDROME: ICD-10-CM

## 2022-12-29 DIAGNOSIS — K31.7 GASTRIC POLYP: ICD-10-CM

## 2022-12-29 DIAGNOSIS — Z80.0 FAMILY HISTORY OF PANCREATIC CANCER: Primary | ICD-10-CM

## 2022-12-29 DIAGNOSIS — Z12.11 COLON CANCER SCREENING: ICD-10-CM

## 2022-12-29 RX ORDER — SODIUM CHLORIDE 0.9 % (FLUSH) 0.9 %
3 SYRINGE (ML) INJECTION EVERY 12 HOURS SCHEDULED
Status: CANCELLED | OUTPATIENT
Start: 2022-12-29

## 2022-12-29 RX ORDER — SODIUM CHLORIDE, SODIUM LACTATE, POTASSIUM CHLORIDE, CALCIUM CHLORIDE 600; 310; 30; 20 MG/100ML; MG/100ML; MG/100ML; MG/100ML
30 INJECTION, SOLUTION INTRAVENOUS CONTINUOUS PRN
Status: CANCELLED | OUTPATIENT
Start: 2022-12-29

## 2022-12-29 RX ORDER — SODIUM CHLORIDE 0.9 % (FLUSH) 0.9 %
10 SYRINGE (ML) INJECTION AS NEEDED
Status: CANCELLED | OUTPATIENT
Start: 2022-12-29

## 2022-12-29 NOTE — TELEPHONE ENCOUNTER
Patient called to schedule her yearly egd/colonoscopy mri and labs. Sent a message to Lizbeth to place orders and scheduled patient for 3/22/23

## 2022-12-30 PROBLEM — Z12.11 COLON CANCER SCREENING: Status: ACTIVE | Noted: 2022-12-30

## 2023-03-13 DIAGNOSIS — T50.8X5S ALLERGIC REACTION TO CONTRAST MATERIAL, SEQUELA: ICD-10-CM

## 2023-03-15 RX ORDER — PREDNISONE 50 MG/1
TABLET ORAL
Qty: 3 TABLET | Refills: 0 | Status: SHIPPED | OUTPATIENT
Start: 2023-03-15

## 2023-03-15 RX ORDER — DIPHENHYDRAMINE HCL 25 MG
CAPSULE ORAL
Qty: 2 CAPSULE | Refills: 0 | Status: SHIPPED | OUTPATIENT
Start: 2023-03-15

## 2023-03-15 NOTE — TELEPHONE ENCOUNTER
Yes, your procedures are scheduled for March 22, 2023, no changes.  I sent in for prednisone and Benadryl for contrast allergy.  Let me know if you have any issues.  Thanks so much.  Moise

## 2023-03-16 ENCOUNTER — LAB (OUTPATIENT)
Dept: LAB | Facility: HOSPITAL | Age: 28
End: 2023-03-16
Payer: COMMERCIAL

## 2023-03-16 PROCEDURE — 85025 COMPLETE CBC W/AUTO DIFF WBC: CPT | Performed by: NURSE PRACTITIONER

## 2023-03-16 PROCEDURE — 81003 URINALYSIS AUTO W/O SCOPE: CPT | Performed by: NURSE PRACTITIONER

## 2023-03-16 PROCEDURE — 80053 COMPREHEN METABOLIC PANEL: CPT | Performed by: NURSE PRACTITIONER

## 2023-03-17 NOTE — SIGNIFICANT NOTE
Education provided the Patient on the following:    - Nothing to Eat or Drink after MN the night before the procedure    - Avoid red/purple fluids while completing their bowel prep as ordered by physician  -Contact Gastrointerologist office for any questions about specific details regarding colon prep    -You will need to have someone drive you home after your colonoscopy and remain with you for 24 hours after the procedure  - The date of your Surgery, you may have one visitor at bedside or within 10-15 minutes of [facility]  -Please wear warm socks when you arrive for your colonoscopy  -Remove all jewelry and leave any valuables before arriving the day of your procedure (all will have to be removed before leaving preop)  -You will need to arrive at 0915 on 3/22 for your colonoscopy    -Feel free to contact us at: [phone] with any additional questions/concerns

## 2023-03-20 ENCOUNTER — HOSPITAL ENCOUNTER (OUTPATIENT)
Dept: MRI IMAGING | Facility: HOSPITAL | Age: 28
Discharge: HOME | End: 2023-03-20
Admitting: NURSE PRACTITIONER
Payer: COMMERCIAL

## 2023-03-20 DIAGNOSIS — Q85.89 PEUTZ-JEGHERS SYNDROME: ICD-10-CM

## 2023-03-20 DIAGNOSIS — Z80.0 FAMILY HISTORY OF PANCREATIC CANCER: ICD-10-CM

## 2023-03-20 PROCEDURE — 74183 MRI ABD W/O CNTR FLWD CNTR: CPT

## 2023-03-20 PROCEDURE — 0 GADOBENATE DIMEGLUMINE 529 MG/ML SOLUTION: Performed by: NURSE PRACTITIONER

## 2023-03-20 PROCEDURE — A9577 INJ MULTIHANCE: HCPCS | Performed by: NURSE PRACTITIONER

## 2023-03-20 RX ADMIN — GADOBENATE DIMEGLUMINE 11 ML: 529 INJECTION, SOLUTION INTRAVENOUS at 10:41

## 2023-03-22 ENCOUNTER — ANESTHESIA EVENT (OUTPATIENT)
Dept: SURGERY | Facility: SURGERY CENTER | Age: 28
End: 2023-03-22
Payer: COMMERCIAL

## 2023-03-22 ENCOUNTER — HOSPITAL ENCOUNTER (OUTPATIENT)
Facility: SURGERY CENTER | Age: 28
Setting detail: HOSPITAL OUTPATIENT SURGERY
Discharge: HOME | End: 2023-03-22
Attending: INTERNAL MEDICINE | Admitting: INTERNAL MEDICINE
Payer: COMMERCIAL

## 2023-03-22 ENCOUNTER — ANESTHESIA (OUTPATIENT)
Dept: SURGERY | Facility: SURGERY CENTER | Age: 28
End: 2023-03-22
Payer: COMMERCIAL

## 2023-03-22 VITALS
TEMPERATURE: 98 F | WEIGHT: 117 LBS | SYSTOLIC BLOOD PRESSURE: 119 MMHG | HEIGHT: 65 IN | HEART RATE: 69 BPM | DIASTOLIC BLOOD PRESSURE: 80 MMHG | OXYGEN SATURATION: 100 % | RESPIRATION RATE: 16 BRPM | BODY MASS INDEX: 19.49 KG/M2

## 2023-03-22 DIAGNOSIS — Q85.89 PEUTZ-JEGHERS SYNDROME: ICD-10-CM

## 2023-03-22 DIAGNOSIS — Z12.11 COLON CANCER SCREENING: ICD-10-CM

## 2023-03-22 DIAGNOSIS — K63.5 POLYP OF COLON, UNSPECIFIED PART OF COLON, UNSPECIFIED TYPE: ICD-10-CM

## 2023-03-22 DIAGNOSIS — K31.7 GASTRIC POLYP: ICD-10-CM

## 2023-03-22 PROCEDURE — 45378 DIAGNOSTIC COLONOSCOPY: CPT | Performed by: INTERNAL MEDICINE

## 2023-03-22 PROCEDURE — 25010000002 PROPOFOL 10 MG/ML EMULSION: Performed by: ANESTHESIOLOGY

## 2023-03-22 PROCEDURE — 43239 EGD BIOPSY SINGLE/MULTIPLE: CPT | Performed by: INTERNAL MEDICINE

## 2023-03-22 PROCEDURE — 81025 URINE PREGNANCY TEST: CPT | Performed by: NURSE PRACTITIONER

## 2023-03-22 PROCEDURE — 88305 TISSUE EXAM BY PATHOLOGIST: CPT | Performed by: INTERNAL MEDICINE

## 2023-03-22 RX ORDER — SODIUM CHLORIDE 0.9 % (FLUSH) 0.9 %
3 SYRINGE (ML) INJECTION EVERY 12 HOURS SCHEDULED
Status: DISCONTINUED | OUTPATIENT
Start: 2023-03-22 | End: 2023-03-22 | Stop reason: HOSPADM

## 2023-03-22 RX ORDER — PROPRANOLOL HYDROCHLORIDE 10 MG/1
TABLET ORAL
COMMUNITY
Start: 2023-03-08

## 2023-03-22 RX ORDER — LIDOCAINE HYDROCHLORIDE 20 MG/ML
INJECTION, SOLUTION INFILTRATION; PERINEURAL AS NEEDED
Status: DISCONTINUED | OUTPATIENT
Start: 2023-03-22 | End: 2023-03-22 | Stop reason: SURG

## 2023-03-22 RX ORDER — PROPOFOL 10 MG/ML
VIAL (ML) INTRAVENOUS AS NEEDED
Status: DISCONTINUED | OUTPATIENT
Start: 2023-03-22 | End: 2023-03-22 | Stop reason: SURG

## 2023-03-22 RX ORDER — MAGNESIUM HYDROXIDE 1200 MG/15ML
LIQUID ORAL AS NEEDED
Status: DISCONTINUED | OUTPATIENT
Start: 2023-03-22 | End: 2023-03-22 | Stop reason: HOSPADM

## 2023-03-22 RX ORDER — SODIUM CHLORIDE, SODIUM LACTATE, POTASSIUM CHLORIDE, CALCIUM CHLORIDE 600; 310; 30; 20 MG/100ML; MG/100ML; MG/100ML; MG/100ML
30 INJECTION, SOLUTION INTRAVENOUS CONTINUOUS PRN
Status: DISCONTINUED | OUTPATIENT
Start: 2023-03-22 | End: 2023-03-22 | Stop reason: HOSPADM

## 2023-03-22 RX ORDER — SODIUM CHLORIDE 0.9 % (FLUSH) 0.9 %
10 SYRINGE (ML) INJECTION AS NEEDED
Status: DISCONTINUED | OUTPATIENT
Start: 2023-03-22 | End: 2023-03-22 | Stop reason: HOSPADM

## 2023-03-22 RX ADMIN — LIDOCAINE HYDROCHLORIDE 30 MG: 20 INJECTION, SOLUTION INFILTRATION; PERINEURAL at 09:26

## 2023-03-22 RX ADMIN — PROPOFOL 120 MG: 10 INJECTION, EMULSION INTRAVENOUS at 09:28

## 2023-03-22 RX ADMIN — PROPOFOL 180 MCG/KG/MIN: 10 INJECTION, EMULSION INTRAVENOUS at 09:28

## 2023-03-22 RX ADMIN — SODIUM CHLORIDE, SODIUM LACTATE, POTASSIUM CHLORIDE, AND CALCIUM CHLORIDE 30 ML/HR: .6; .31; .03; .02 INJECTION, SOLUTION INTRAVENOUS at 09:03

## 2023-03-22 NOTE — H&P
No chief complaint on file.      HPI  peutz jeger  H/o polyps         Problem List:    Patient Active Problem List   Diagnosis   • Peutz-Jeghers syndrome   • Polyp of colon   • Gastric polyp   • Family history of pancreatic cancer   • Colon cancer screening       Medical History:    Past Medical History:   Diagnosis Date   • Anemia    • Nausea and vomiting    • Peutz-Jeghers syndrome    • Seasonal allergies         Social History:    Social History     Socioeconomic History   • Marital status: Single   Tobacco Use   • Smoking status: Never   • Smokeless tobacco: Never   Vaping Use   • Vaping Use: Never used   Substance and Sexual Activity   • Alcohol use: Yes     Comment: social    • Drug use: Never       Family History:   Family History   Problem Relation Age of Onset   • Colonic polyp Other    • Pancreatic cancer Other    • Colon cancer Neg Hx    • Colon polyps Neg Hx    • Crohn's disease Neg Hx    • Irritable bowel syndrome Neg Hx    • Ulcerative colitis Neg Hx        Surgical History:   Past Surgical History:   Procedure Laterality Date   • ADENOIDECTOMY     • CAPSULE ENDOSCOPY     • COLONOSCOPY     • COLONOSCOPY N/A 3/18/2021    Procedure: COLONOSCOPY;  Surgeon: Jonel Phillip MD;  Location: [facility] [place] [facility];  Service: Gastroenterology;  Laterality: N/A;   • COLONOSCOPY N/A 3/16/2022    Procedure: COLONOSCOPY FOR SCREENING;  Surgeon: Jonel Phillip MD;  [facility];  Service: Gastroenterology;  Laterality: N/A;  mild erosion ileocecal valve   • ENDOSCOPY N/A 3/18/2021    Procedure: ESOPHAGOGASTRODUODENOSCOPY;  Surgeon: Jonel Phillip MD;  Location: [facility] [place] [facility];  Service: Gastroenterology;  Laterality: N/A;   • ENDOSCOPY N/A 3/16/2022    Procedure: ESOPHAGOGASTRODUODENOSCOPY;  Surgeon: Jonel Phillip MD;  Location: [facility] [facility] OR;  Service: Gastroenterology;  Laterality: N/A;  gastric polyps   • LIP REPAIR      Lip removal of skin   • SINUS SURGERY     • UPPER GASTROINTESTINAL ENDOSCOPY      • WISDOM TOOTH EXTRACTION         No current facility-administered medications for this encounter.    Current Outpatient Medications:   •  cetirizine (zyrTEC) 10 MG tablet, Take 10 mg by mouth Daily., Disp: , Rfl:   •  diphenhydrAMINE (Benadryl Allergy) 25 mg capsule, Take 50 mg (2 pills) by mouth one hour prior to scheduled testing., Disp: 2 capsule, Rfl: 0  •  levonorgestrel (MIRENA) 20 MCG/24HR IUD, 1 each by Intrauterine route., Disp: , Rfl:   •  lisdexamfetamine (VYVANSE) 20 MG capsule, , Disp: , Rfl:   •  montelukast (SINGULAIR) 10 MG tablet, TAKE 1 TAB BY MOUTH EVERY MORNING FOR 90 DAYS, Disp: , Rfl:   •  Norethin-Eth Estrad-Fe Biphas (LO LOESTRIN FE) 1 MG-10 MCG / 10 MCG tablet, TAKE 1 TABLET BY MOUTH EVERY DAY, Disp: , Rfl:   •  predniSONE (DELTASONE) 50 MG tablet, Take one pill by mouth 13 hours before scheduled test, Take one pill 8 hours before scheduled test. Take one pill one hour before scheduled test, Disp: 3 tablet, Rfl: 0    Allergies:   Allergies   Allergen Reactions   • Contrast Dye (Echo Or Unknown Ct/Mr) Hives   • Other Other (See Comments)     Pollen,trees,dust,mold,american cockroaches,rat poop,ragweed( and other grasses),mildew,evergreen and cats  Cats  Dogs  Trees  Pollen  Dust  Mold  Ragweed  Grass  Rats  Cockroaches  Pollen,trees,dust,mold,american cockroaches,rat poop,ragweed( and other grasses),mildew,evergreen and cats     • Multihance [Gadobenate] Rash        The following portions of the patient's history were reviewed by me and updated as appropriate: review of systems, allergies, current medications, past family history, past medical history, past social history, past surgical history and problem list.    There were no vitals filed for this visit.    PHYSICAL EXAM:    CONSTITUTIONAL:  today's vital signs reviewed by me  GASTROINTESTINAL: abdomen is soft nontender nondistended with normal active bowel sounds, no masses are appreciated    Assessment/ Plan  pamella robins  H/o  polyps    egd and colonoscopy    Risks and benefits as well as alternatives to endoscopic evaluation were explained to the patient and they voiced understanding and wish to proceed.  These risks include but are not limited to the risk of bleeding, perforation, adverse reaction to sedation, and missed lesions.  The patient was given the opportunity to ask questions prior to the endoscopic procedure.

## 2023-03-22 NOTE — ANESTHESIA POSTPROCEDURE EVALUATION
Patient: Monse Kenyon    Procedure Summary     Date: 03/22/23 Room [facility]    Anesthesia Start: 0924 Anesthesia Stop: 0951    Procedures:       COLONOSCOPY      ESOPHAGOGASTRODUODENOSCOPY Diagnosis:       Polyp of colon, unspecified part of colon, unspecified type      Peutz-Jeghers syndrome (HCC)      Gastric polyp      Colon cancer screening      (Polyp of colon, unspecified part of colon, unspecified type [K63.5])      (Peutz-Jeghers syndrome [Q85.89])      (Gastric polyp [K31.7])      (Colon cancer screening [Z12.11])    Surgeons: Jonel Phillip MD Provider: Emy Villanueva MD    Anesthesia Type: MAC ASA Status: 2          Anesthesia Type: MAC    Vitals  Vitals Value Taken Time   /80 03/22/23 1002   Temp 36.7 °C (98 °F) 03/22/23 0953   Pulse 69 03/22/23 1002   Resp 16 03/22/23 1002   SpO2 100 % 03/22/23 1002           Post Anesthesia Care and Evaluation    Patient location during evaluation: bedside  Patient participation: complete - patient participated  Level of consciousness: awake and alert  Pain management: adequate    Airway patency: patent  Anesthetic complications: No anesthetic complications  PONV Status: controlled  Cardiovascular status: acceptable  Respiratory status: acceptable  Hydration status: acceptable

## 2023-03-23 LAB
LAB AP CASE REPORT: NORMAL
LAB AP CLINICAL INFORMATION: NORMAL
PATH REPORT.FINAL DX SPEC: NORMAL
PATH REPORT.GROSS SPEC: NORMAL

## 2023-04-12 ENCOUNTER — TELEMEDICINE (OUTPATIENT)
Dept: GASTROENTEROLOGY | Facility: CLINIC | Age: 28
End: 2023-04-12
Payer: COMMERCIAL

## 2023-04-12 DIAGNOSIS — Q85.89 PEUTZ-JEGHERS SYNDROME: Primary | ICD-10-CM

## 2023-04-12 DIAGNOSIS — R79.89 ELEVATED LIVER FUNCTION TESTS: ICD-10-CM

## 2023-04-12 PROCEDURE — 99214 OFFICE O/P EST MOD 30 MIN: CPT | Performed by: NURSE PRACTITIONER

## 2023-04-12 NOTE — PROGRESS NOTES
Chief Complaint   Patient presents with   • Follow-up     Review recent EGD, colonoscopy and MRI           History of Present Illness  27-year-old female presents today for follow-up.  Last visit April 7, 2021.  She presents today for follow-up after EGD, colonoscopy and MRI.  She has a history of Peutz-Jeghers Syndrome syndrome, genetic testing consistent for sporadic mutation.  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    She is doing well from a GI standpoint.    Recent blood work with mildly elevated ALT and alkaline phosphatase.    She is up-to-date with gynecological exams and breast exams.    You have chosen to receive care through a telehealth visit.  Do you consent to use a video/audio connection for your medical care today? Yes    Physical Exam  Constitutional:       General: She is not in acute distress.     Appearance: Normal appearance. She is well-developed and normal weight. She is not ill-appearing.   Pulmonary:      Effort: No respiratory distress.   Skin:     Coloration: Skin is not pale.   Neurological:      Mental Status: She is alert.          Review of Systems      Result Review :      Urinalysis without microscopic (no culture) - Urine, Clean Catch (03/16/2023 08:14)   CBC & Differential (03/16/2023 08:14)   Comprehensive Metabolic Panel (03/16/2023 08:14)   Tissue Pathology Exam (03/22/2023 09:31)   COLONOSCOPY (03/22/2023 09:16)   UPPER GI ENDOSCOPY (03/22/2023 09:16)       Assessment and Plan    Diagnoses and all orders for this visit:    1. Peutz-Jeghers syndrome (Primary)  -     Endoscopy, GI With Capsule    2. Elevated liver function tests  -     Hepatic Function Panel; Future       Mildly elevated transaminase and alkaline phosphatase, MRI normal liver,  Proceed with repeat hepatic function panel March 2023.    Proceed with small bowel capsule endoscopy for evaluation of small bowel, this is due every 3 years, last evaluation 2020.     Reviewed recent EGD and colonoscopy.     Discussed  overall monitoring for patients with Peutz-Jeghers syndrome with higher risk for pancreatic and breast cancer.    Recommendations include yearly CBCs, EGD and colonoscopy yearly.  Capsule endoscopy or MR enterography every 3 years, due now  Yearly breast exam.  Yearly cervical exam and pelvic ultrasound.  Pancreatic imaging with MRCP or endoscopic ultrasound which should be done every 1 to 2 years given family history of pancreatic cancer as well.     She is due for pancreatic imaging with MRCP March 2024, last was March 2023.     Last capsule endoscopy June 2020, she is due for evaluation of small bowel, orders placed.      We will plan on EGD and colonoscopy in 1 year continued monitoring March 2024.      EMR Dragon/Transcription Disclaimer:  This document has been Dictated utilizing Dragon dictation.

## 2023-09-18 ENCOUNTER — TELEPHONE (OUTPATIENT)
Dept: GASTROENTEROLOGY | Facility: CLINIC | Age: 28
End: 2023-09-18
Payer: COMMERCIAL

## 2023-09-18 ENCOUNTER — PATIENT MESSAGE (OUTPATIENT)
Dept: GASTROENTEROLOGY | Facility: CLINIC | Age: 28
End: 2023-09-18
Payer: COMMERCIAL

## 2023-09-18 NOTE — TELEPHONE ENCOUNTER
"  Caller: Monse Kenyon \"LINA\"    Relationship: Self    Best call back number: [phone]     What was the call regarding: PT IS NEEDING PREP INSTRUCTIONS UPLOADED TO [employer] FOR PROCEDURE ON 9/20/23.     Is it okay if the provider responds through [employer]: YES        "

## 2023-09-20 ENCOUNTER — CLINICAL SUPPORT (OUTPATIENT)
Dept: GASTROENTEROLOGY | Facility: CLINIC | Age: 28
End: 2023-09-20
Payer: COMMERCIAL

## 2023-09-20 ENCOUNTER — TELEPHONE (OUTPATIENT)
Dept: GASTROENTEROLOGY | Facility: CLINIC | Age: 28
End: 2023-09-20
Payer: COMMERCIAL

## 2023-09-20 PROCEDURE — 91110 GI TRC IMG INTRAL ESOPH-ILE: CPT | Performed by: INTERNAL MEDICINE

## 2024-04-21 ENCOUNTER — PATIENT MESSAGE (OUTPATIENT)
Dept: GASTROENTEROLOGY | Facility: CLINIC | Age: 29
End: 2024-04-21
Payer: COMMERCIAL

## 2024-04-21 DIAGNOSIS — Z11.1 SCREENING-PULMONARY TB: ICD-10-CM

## 2024-04-21 DIAGNOSIS — Z80.0 FAMILY HISTORY OF PANCREATIC CANCER: ICD-10-CM

## 2024-04-21 DIAGNOSIS — Q85.89 PEUTZ-JEGHERS SYNDROME: Primary | ICD-10-CM

## 2024-04-21 DIAGNOSIS — Z13.220 NEED FOR LIPID SCREENING: ICD-10-CM

## 2024-04-24 ENCOUNTER — PREP FOR SURGERY (OUTPATIENT)
Dept: SURGERY | Facility: SURGERY CENTER | Age: 29
End: 2024-04-24
Payer: COMMERCIAL

## 2024-04-24 DIAGNOSIS — Q85.89 PEUTZ-JEGHERS SYNDROME: Primary | ICD-10-CM

## 2024-04-24 RX ORDER — SODIUM CHLORIDE 0.9 % (FLUSH) 0.9 %
3 SYRINGE (ML) INJECTION EVERY 12 HOURS SCHEDULED
OUTPATIENT
Start: 2024-04-24

## 2024-04-24 RX ORDER — SODIUM CHLORIDE 0.9 % (FLUSH) 0.9 %
10 SYRINGE (ML) INJECTION AS NEEDED
OUTPATIENT
Start: 2024-04-24

## 2024-04-24 RX ORDER — SODIUM CHLORIDE, SODIUM LACTATE, POTASSIUM CHLORIDE, CALCIUM CHLORIDE 600; 310; 30; 20 MG/100ML; MG/100ML; MG/100ML; MG/100ML
30 INJECTION, SOLUTION INTRAVENOUS CONTINUOUS PRN
OUTPATIENT
Start: 2024-04-24

## 2024-04-30 ENCOUNTER — PREP FOR SURGERY (OUTPATIENT)
Dept: SURGERY | Facility: SURGERY CENTER | Age: 29
End: 2024-04-30
Payer: COMMERCIAL

## 2024-05-10 ENCOUNTER — TELEPHONE (OUTPATIENT)
Dept: GASTROENTEROLOGY | Facility: CLINIC | Age: 29
End: 2024-05-10
Payer: COMMERCIAL

## 2024-05-20 ENCOUNTER — PATIENT MESSAGE (OUTPATIENT)
Dept: GASTROENTEROLOGY | Facility: CLINIC | Age: 29
End: 2024-05-20
Payer: COMMERCIAL

## 2024-05-20 RX ORDER — PREDNISONE 50 MG/1
TABLET ORAL
Qty: 3 TABLET | Refills: 0 | Status: SHIPPED | OUTPATIENT
Start: 2024-05-20

## 2024-05-23 ENCOUNTER — ANESTHESIA EVENT (OUTPATIENT)
Dept: SURGERY | Facility: SURGERY CENTER | Age: 29
End: 2024-05-23
Payer: COMMERCIAL

## 2024-05-23 ENCOUNTER — ANESTHESIA (OUTPATIENT)
Dept: SURGERY | Facility: SURGERY CENTER | Age: 29
End: 2024-05-23
Payer: COMMERCIAL

## 2024-05-23 ENCOUNTER — HOSPITAL ENCOUNTER (OUTPATIENT)
Facility: SURGERY CENTER | Age: 29
Setting detail: HOSPITAL OUTPATIENT SURGERY
Discharge: HOME | End: 2024-05-23
Attending: INTERNAL MEDICINE | Admitting: INTERNAL MEDICINE
Payer: COMMERCIAL

## 2024-05-23 VITALS
RESPIRATION RATE: 16 BRPM | WEIGHT: 126.2 LBS | DIASTOLIC BLOOD PRESSURE: 68 MMHG | SYSTOLIC BLOOD PRESSURE: 107 MMHG | BODY MASS INDEX: 21.02 KG/M2 | OXYGEN SATURATION: 100 % | HEART RATE: 88 BPM | HEIGHT: 65 IN | TEMPERATURE: 98.2 F

## 2024-05-23 DIAGNOSIS — Q85.89 PEUTZ-JEGHERS SYNDROME: ICD-10-CM

## 2024-05-23 PROCEDURE — 25010000002 PROPOFOL 10 MG/ML EMULSION: Performed by: NURSE ANESTHETIST, CERTIFIED REGISTERED

## 2024-05-23 PROCEDURE — 43239 EGD BIOPSY SINGLE/MULTIPLE: CPT | Performed by: INTERNAL MEDICINE

## 2024-05-23 PROCEDURE — 43251 EGD REMOVE LESION SNARE: CPT | Performed by: INTERNAL MEDICINE

## 2024-05-23 PROCEDURE — 45378 DIAGNOSTIC COLONOSCOPY: CPT | Performed by: INTERNAL MEDICINE

## 2024-05-23 PROCEDURE — 81025 URINE PREGNANCY TEST: CPT | Performed by: NURSE PRACTITIONER

## 2024-05-23 PROCEDURE — 25810000003 LACTATED RINGERS PER 1000 ML: Performed by: NURSE PRACTITIONER

## 2024-05-23 PROCEDURE — 25010000002 LIDOCAINE 1 % SOLUTION: Performed by: NURSE ANESTHETIST, CERTIFIED REGISTERED

## 2024-05-23 PROCEDURE — 25010000002 PROPOFOL 1000 MG/100ML EMULSION: Performed by: NURSE ANESTHETIST, CERTIFIED REGISTERED

## 2024-05-23 PROCEDURE — 88305 TISSUE EXAM BY PATHOLOGIST: CPT | Performed by: INTERNAL MEDICINE

## 2024-05-23 RX ORDER — PROPOFOL 10 MG/ML
INJECTION, EMULSION INTRAVENOUS AS NEEDED
Status: DISCONTINUED | OUTPATIENT
Start: 2024-05-23 | End: 2024-05-23 | Stop reason: SURG

## 2024-05-23 RX ORDER — SODIUM CHLORIDE 0.9 % (FLUSH) 0.9 %
3 SYRINGE (ML) INJECTION EVERY 12 HOURS SCHEDULED
Status: DISCONTINUED | OUTPATIENT
Start: 2024-05-23 | End: 2024-05-23 | Stop reason: HOSPADM

## 2024-05-23 RX ORDER — LIDOCAINE HYDROCHLORIDE 10 MG/ML
INJECTION, SOLUTION INFILTRATION; PERINEURAL AS NEEDED
Status: DISCONTINUED | OUTPATIENT
Start: 2024-05-23 | End: 2024-05-23 | Stop reason: SURG

## 2024-05-23 RX ORDER — SODIUM CHLORIDE 0.9 % (FLUSH) 0.9 %
10 SYRINGE (ML) INJECTION AS NEEDED
Status: DISCONTINUED | OUTPATIENT
Start: 2024-05-23 | End: 2024-05-23 | Stop reason: HOSPADM

## 2024-05-23 RX ORDER — SODIUM CHLORIDE, SODIUM LACTATE, POTASSIUM CHLORIDE, CALCIUM CHLORIDE 600; 310; 30; 20 MG/100ML; MG/100ML; MG/100ML; MG/100ML
30 INJECTION, SOLUTION INTRAVENOUS CONTINUOUS PRN
Status: DISCONTINUED | OUTPATIENT
Start: 2024-05-23 | End: 2024-05-23 | Stop reason: HOSPADM

## 2024-05-23 RX ADMIN — PROPOFOL 100 MG: 10 INJECTION, EMULSION INTRAVENOUS at 09:02

## 2024-05-23 RX ADMIN — PROPOFOL 160 MCG/KG/MIN: 10 INJECTION, EMULSION INTRAVENOUS at 08:56

## 2024-05-23 RX ADMIN — PROPOFOL 100 MG: 10 INJECTION, EMULSION INTRAVENOUS at 08:56

## 2024-05-23 RX ADMIN — LIDOCAINE HYDROCHLORIDE 50 MG: 10 INJECTION, SOLUTION INFILTRATION; PERINEURAL at 08:56

## 2024-05-23 RX ADMIN — SODIUM CHLORIDE, POTASSIUM CHLORIDE, SODIUM LACTATE AND CALCIUM CHLORIDE 30 ML/HR: 600; 310; 30; 20 INJECTION, SOLUTION INTRAVENOUS at 07:30

## 2024-05-23 NOTE — ANESTHESIA POSTPROCEDURE EVALUATION
"Patient: Monse Kenyon    Procedure Summary       Date: 05/23/24 Room [facility]    Anesthesia Start: 0852 Anesthesia Stop: 0931    Procedures:       ESOPHAGOGASTRODUODENOSCOPY WITH BIOPSY, SNARE POLYPECTOMY      COLONOSCOPY TO CECUM Diagnosis:       Peutz-Jeghers syndrome      (Peutz-Jeghers syndrome [Q85.89])    Surgeons: Jonel Phillip MD Provider: Yury Andrea DO    Anesthesia Type: MAC ASA Status: 2            Anesthesia Type: MAC    Vitals  Vitals Value Taken Time   /68 05/23/24 0935   Temp 36.8 °C (98.2 °F) 05/23/24 0930   Pulse 88 05/23/24 0935   Resp 16 05/23/24 0935   SpO2 100 % 05/23/24 0935           Post Anesthesia Care and Evaluation    Patient location during evaluation: bedside  Patient participation: complete - patient participated  Level of consciousness: awake and alert  Pain management: adequate    Airway patency: patent  Anesthetic complications: No anesthetic complications  PONV Status: controlled  Cardiovascular status: acceptable and hemodynamically stable  Respiratory status: acceptable, spontaneous ventilation and nonlabored ventilation  Hydration status: acceptable    Comments: /68   Pulse 88   Temp 36.8 °C (98.2 °F) (Oral)   Resp 16   Ht 165.1 cm (65\")   Wt 57.2 kg (126 lb 3.2 oz)   SpO2 100%   BMI 21.00 kg/m²       "

## 2024-05-23 NOTE — ANESTHESIA PREPROCEDURE EVALUATION
Anesthesia Evaluation     Patient summary reviewed   no history of anesthetic complications:   NPO Solid Status: > 8 hours  NPO Liquid Status: > 2 hours           Airway   Mallampati: II  TM distance: >3 FB  Neck ROM: full  No difficulty expected  Dental - normal exam     Pulmonary     breath sounds clear to auscultation  (-) shortness of breath, recent URI, not a smoker  Cardiovascular   Exercise tolerance: good (4-7 METS)    Patient on routine beta blocker and Beta blocker given within 24 hours of surgery  Rhythm: regular  Rate: normal    (-) past MI, dysrhythmias, angina      Neuro/Psych  (-) seizures, CVA  GI/Hepatic/Renal/Endo    (-)  obesity, no renal disease, diabetes    ROS Comment: Peutz-Jeghers syndrome; h/o polyps, famhx pancreatic ca    Musculoskeletal     (-) neck stiffness  Abdominal    Substance History      OB/GYN          Other                          Anesthesia Plan    ASA 2     MAC     (MAC anesthesia discussed with patient and/or patient representative. Risks (including but not limited to intra-op awareness), benefits, and alternatives were discussed. Understanding was voiced with an agreement to proceed with a MAC technique and General as a backup option. )    Anesthetic plan, risks, benefits, and alternatives have been provided, discussed and informed consent has been obtained with: patient.        CODE STATUS:

## 2024-05-23 NOTE — H&P
No chief complaint on file.      HPI  Peutz-jeger  Gastric polyps  Ileal ulcers         Problem List:    Patient Active Problem List   Diagnosis    Peutz-Jeghers syndrome    Polyp of colon    Gastric polyp    Family history of pancreatic cancer    Colon cancer screening       Medical History:    Past Medical History:   Diagnosis Date    Anemia     Nausea and vomiting     Peutz-Jeghers syndrome     Seasonal allergies         Social History:    Social History     Socioeconomic History    Marital status: Single   Tobacco Use    Smoking status: Never    Smokeless tobacco: Never   Vaping Use    Vaping status: Never Used   Substance and Sexual Activity    Alcohol use: Yes     Comment: social     Drug use: Never       Family History:   Family History   Problem Relation Age of Onset    Colonic polyp Other     Pancreatic cancer Other     Colon cancer Neg Hx     Colon polyps Neg Hx     Crohn's disease Neg Hx     Irritable bowel syndrome Neg Hx     Ulcerative colitis Neg Hx        Surgical History:   Past Surgical History:   Procedure Laterality Date    ADENOIDECTOMY      CAPSULE ENDOSCOPY      COLONOSCOPY      COLONOSCOPY N/A 3/18/2021    Procedure: COLONOSCOPY;  Surgeon: Jonel Phillip MD;  Location: [facility];  Service: Gastroenterology;  Laterality: N/A;    COLONOSCOPY N/A 3/16/2022    Procedure: COLONOSCOPY FOR SCREENING;  Surgeon: Jonel Phillip MD;  Location: [facility];  Service: Gastroenterology;  Laterality: N/A;  mild erosion ileocecal valve    COLONOSCOPY N/A 3/22/2023    Procedure: COLONOSCOPY;  Surgeon: Jonel Phillip MD;  Location: [facility];  Service: Gastroenterology;  Laterality: N/A;    ENDOSCOPY N/A 3/18/2021    Procedure: ESOPHAGOGASTRODUODENOSCOPY;  Surgeon: Jonel Phillip MD;  Location: [facility];  Service: Gastroenterology;  Laterality: N/A;    ENDOSCOPY N/A 3/16/2022    Procedure: ESOPHAGOGASTRODUODENOSCOPY;  Surgeon: Jonel Phillip MD;  Location: [facility];   Service: Gastroenterology;  Laterality: N/A;  gastric polyps    ENDOSCOPY N/A 3/22/2023    Procedure: ESOPHAGOGASTRODUODENOSCOPY;  Surgeon: Jonel Phillip MD;  Location: [facility];  Service: Gastroenterology;  Laterality: N/A;  gastric polyps     LIP REPAIR      Lip removal of skin    SINUS SURGERY      UPPER GASTROINTESTINAL ENDOSCOPY      WISDOM TOOTH EXTRACTION           Current Facility-Administered Medications:     lactated ringers infusion, 30 mL/hr, Intravenous, Continuous PRN, Rajat, Shruthi G, APRN    sodium chloride 0.9 % flush 10 mL, 10 mL, Intravenous, PRN, Rajat, Shruthi G, APRN    sodium chloride 0.9 % flush 3 mL, 3 mL, Intravenous, Q12H, Rajat, Shruthi G, APRN    Allergies:   Allergies   Allergen Reactions    Contrast Dye (Echo Or Unknown Ct/Mr) Hives    Other Other (See Comments)     Pollen,trees,dust,mold,american cockroaches,rat poop,ragweed( and other grasses),mildew,evergreen and cats  Cats  Dogs  Trees  Pollen  Dust  Mold  Ragweed  Grass  Rats  Cockroaches  Pollen,trees,dust,mold,american cockroaches,rat poop,ragweed( and other grasses),mildew,evergreen and cats      Multihance [Gadobenate] Rash        The following portions of the patient's history were reviewed by me and updated as appropriate: review of systems, allergies, current medications, past family history, past medical history, past social history, past surgical history and problem list.    There were no vitals filed for this visit.    PHYSICAL EXAM:    CONSTITUTIONAL:  today's vital signs reviewed by me  GASTROINTESTINAL: abdomen is soft nontender nondistended with normal active bowel sounds, no masses are appreciated    Assessment/ Plan  Peutz-jeger  Gastric polyps  Ileal ulcers    Egd and colonoscopy    Risks and benefits as well as alternatives to endoscopic evaluation were explained to the patient and they voiced understanding and wish to proceed.  These risks include but are not limited to the risk of bleeding,  perforation, adverse reaction to sedation, and missed lesions.  The patient was given the opportunity to ask questions prior to the endoscopic procedure.

## 2024-05-24 ENCOUNTER — HOSPITAL ENCOUNTER (OUTPATIENT)
Dept: MRI IMAGING | Facility: HOSPITAL | Age: 29
Discharge: HOME | End: 2024-05-24
Admitting: NURSE PRACTITIONER
Payer: COMMERCIAL

## 2024-05-24 DIAGNOSIS — Z80.0 FAMILY HISTORY OF PANCREATIC CANCER: ICD-10-CM

## 2024-05-24 DIAGNOSIS — Q85.89 PEUTZ-JEGHERS SYNDROME: ICD-10-CM

## 2024-05-24 LAB
LAB AP CASE REPORT: NORMAL
LAB AP CLINICAL INFORMATION: NORMAL
LAB AP DIAGNOSIS COMMENT: NORMAL
PATH REPORT.FINAL DX SPEC: NORMAL
PATH REPORT.GROSS SPEC: NORMAL

## 2024-05-24 PROCEDURE — 74183 MRI ABD W/O CNTR FLWD CNTR: CPT

## 2024-05-24 PROCEDURE — A9577 INJ MULTIHANCE: HCPCS | Performed by: NURSE PRACTITIONER

## 2024-05-24 PROCEDURE — 0 GADOBENATE DIMEGLUMINE 529 MG/ML SOLUTION: Performed by: NURSE PRACTITIONER

## 2024-05-24 RX ADMIN — GADOBENATE DIMEGLUMINE 11 ML: 529 INJECTION, SOLUTION INTRAVENOUS at 09:43

## 2024-05-29 ENCOUNTER — PATIENT MESSAGE (OUTPATIENT)
Dept: GASTROENTEROLOGY | Facility: CLINIC | Age: 29
End: 2024-05-29

## 2024-06-03 ENCOUNTER — PATIENT MESSAGE (OUTPATIENT)
Dept: GASTROENTEROLOGY | Facility: CLINIC | Age: 29
End: 2024-06-03
Payer: COMMERCIAL

## 2024-06-18 NOTE — PROGRESS NOTES
Telephone  9/20/2023  [facility] GASTROENTEROLOGY     Brittny Whiteside MA Capsule study  Reason for call     All Conversations: Capsule study  (Oldest Message First)  September 20, 2023  Brittny Whiteside MA     SG    9/20/23  2:55 PM  Note     Capsule placed this mornign and will be ready to review tomorrow

## (undated) DEVICE — Device

## (undated) DEVICE — GOWN ISOL W/THUMB UNIV BLU BX/15

## (undated) DEVICE — SYR LUER SLPTP 50ML

## (undated) DEVICE — BITEBLOCK OMNI BLOC

## (undated) DEVICE — GOWN PROC ENDOARMOR GI LVL3 HY/SHLD UNIV

## (undated) DEVICE — SINGLE-USE BIOPSY FORCEPS: Brand: RADIAL JAW 4

## (undated) DEVICE — FLEX ADVANTAGE 1500CC: Brand: FLEX ADVANTAGE

## (undated) DEVICE — LASSO POLYPECTOMY SNARE: Brand: LASSO

## (undated) DEVICE — MSK ENDO PORT O2 POM ELITE CURAPLEX A/

## (undated) DEVICE — VIAL FORMLN CAP 10PCT 20ML

## (undated) DEVICE — CANN O2 ETCO2 FITS ALL CONN CO2 SMPL A/ 7IN DISP LF

## (undated) DEVICE — ADAPT CLN SCPE ENDO PORPOISE BX/50 DISP

## (undated) DEVICE — CANN NASL CO2 TRULINK W/O2 A/

## (undated) DEVICE — KT ORCA ORCAPOD DISP STRL